# Patient Record
Sex: FEMALE | Race: WHITE | NOT HISPANIC OR LATINO | Employment: UNEMPLOYED | URBAN - NONMETROPOLITAN AREA
[De-identification: names, ages, dates, MRNs, and addresses within clinical notes are randomized per-mention and may not be internally consistent; named-entity substitution may affect disease eponyms.]

---

## 2022-02-28 ENCOUNTER — OFFICE VISIT (OUTPATIENT)
Dept: INTERNAL MEDICINE | Facility: CLINIC | Age: 27
End: 2022-02-28

## 2022-02-28 VITALS
DIASTOLIC BLOOD PRESSURE: 70 MMHG | SYSTOLIC BLOOD PRESSURE: 118 MMHG | OXYGEN SATURATION: 97 % | BODY MASS INDEX: 34.06 KG/M2 | HEART RATE: 88 BPM | HEIGHT: 67 IN | WEIGHT: 217 LBS | TEMPERATURE: 98.1 F

## 2022-02-28 DIAGNOSIS — R53.83 FATIGUE, UNSPECIFIED TYPE: ICD-10-CM

## 2022-02-28 DIAGNOSIS — Z13.0 SCREENING FOR BLOOD DISEASE: ICD-10-CM

## 2022-02-28 DIAGNOSIS — F32.A ANXIETY AND DEPRESSION: Primary | ICD-10-CM

## 2022-02-28 DIAGNOSIS — F41.9 ANXIETY AND DEPRESSION: Primary | ICD-10-CM

## 2022-02-28 DIAGNOSIS — Z13.220 LIPID SCREENING: ICD-10-CM

## 2022-02-28 DIAGNOSIS — Z23 NEED FOR DIPHTHERIA-TETANUS-PERTUSSIS (TDAP) VACCINE: ICD-10-CM

## 2022-02-28 DIAGNOSIS — E66.9 CLASS 1 OBESITY WITHOUT SERIOUS COMORBIDITY WITH BODY MASS INDEX (BMI) OF 34.0 TO 34.9 IN ADULT, UNSPECIFIED OBESITY TYPE: ICD-10-CM

## 2022-02-28 DIAGNOSIS — E55.9 VITAMIN D DEFICIENCY: ICD-10-CM

## 2022-02-28 PROCEDURE — 90715 TDAP VACCINE 7 YRS/> IM: CPT | Performed by: FAMILY MEDICINE

## 2022-02-28 PROCEDURE — 90471 IMMUNIZATION ADMIN: CPT | Performed by: FAMILY MEDICINE

## 2022-02-28 PROCEDURE — 99204 OFFICE O/P NEW MOD 45 MIN: CPT | Performed by: FAMILY MEDICINE

## 2022-02-28 RX ORDER — CITALOPRAM 20 MG/1
TABLET ORAL
COMMUNITY
Start: 2022-02-14 | End: 2022-05-19 | Stop reason: ALTCHOICE

## 2022-03-01 LAB
25(OH)D3+25(OH)D2 SERPL-MCNC: 35.7 NG/ML (ref 30–100)
ALBUMIN SERPL-MCNC: 4.5 G/DL (ref 3.5–5.2)
ALBUMIN/GLOB SERPL: 1.8 G/DL
ALP SERPL-CCNC: 58 U/L (ref 39–117)
ALT SERPL-CCNC: 18 U/L (ref 1–33)
AST SERPL-CCNC: 14 U/L (ref 1–32)
BASOPHILS # BLD AUTO: 0.03 10*3/MM3 (ref 0–0.2)
BASOPHILS NFR BLD AUTO: 0.5 % (ref 0–1.5)
BILIRUB SERPL-MCNC: 0.4 MG/DL (ref 0–1.2)
BUN SERPL-MCNC: 12 MG/DL (ref 6–20)
BUN/CREAT SERPL: 14.8 (ref 7–25)
CALCIUM SERPL-MCNC: 9.4 MG/DL (ref 8.6–10.5)
CHLORIDE SERPL-SCNC: 104 MMOL/L (ref 98–107)
CHOLEST SERPL-MCNC: 196 MG/DL (ref 0–200)
CO2 SERPL-SCNC: 24.9 MMOL/L (ref 22–29)
CREAT SERPL-MCNC: 0.81 MG/DL (ref 0.57–1)
EGFR GENE MUT ANL BLD/T: 101.5 ML/MIN/1.73
EOSINOPHIL # BLD AUTO: 0.07 10*3/MM3 (ref 0–0.4)
EOSINOPHIL NFR BLD AUTO: 1.3 % (ref 0.3–6.2)
ERYTHROCYTE [DISTWIDTH] IN BLOOD BY AUTOMATED COUNT: 12.5 % (ref 12.3–15.4)
FOLATE SERPL-MCNC: 9.79 NG/ML (ref 4.78–24.2)
GLOBULIN SER CALC-MCNC: 2.5 GM/DL
GLUCOSE SERPL-MCNC: 88 MG/DL (ref 65–99)
HCT VFR BLD AUTO: 44.2 % (ref 34–46.6)
HDLC SERPL-MCNC: 59 MG/DL (ref 40–60)
HGB BLD-MCNC: 15.2 G/DL (ref 12–15.9)
IMM GRANULOCYTES # BLD AUTO: 0.01 10*3/MM3 (ref 0–0.05)
IMM GRANULOCYTES NFR BLD AUTO: 0.2 % (ref 0–0.5)
LDLC SERPL CALC-MCNC: 126 MG/DL (ref 0–100)
LYMPHOCYTES # BLD AUTO: 2.36 10*3/MM3 (ref 0.7–3.1)
LYMPHOCYTES NFR BLD AUTO: 42.6 % (ref 19.6–45.3)
MCH RBC QN AUTO: 32.6 PG (ref 26.6–33)
MCHC RBC AUTO-ENTMCNC: 34.4 G/DL (ref 31.5–35.7)
MCV RBC AUTO: 94.8 FL (ref 79–97)
MONOCYTES # BLD AUTO: 0.32 10*3/MM3 (ref 0.1–0.9)
MONOCYTES NFR BLD AUTO: 5.8 % (ref 5–12)
NEUTROPHILS # BLD AUTO: 2.75 10*3/MM3 (ref 1.7–7)
NEUTROPHILS NFR BLD AUTO: 49.6 % (ref 42.7–76)
NRBC BLD AUTO-RTO: 0 /100 WBC (ref 0–0.2)
PLATELET # BLD AUTO: 258 10*3/MM3 (ref 140–450)
POTASSIUM SERPL-SCNC: 4.3 MMOL/L (ref 3.5–5.2)
PROT SERPL-MCNC: 7 G/DL (ref 6–8.5)
RBC # BLD AUTO: 4.66 10*6/MM3 (ref 3.77–5.28)
SODIUM SERPL-SCNC: 140 MMOL/L (ref 136–145)
T4 FREE SERPL-MCNC: 1.08 NG/DL (ref 0.93–1.7)
TRIGL SERPL-MCNC: 62 MG/DL (ref 0–150)
TSH SERPL DL<=0.005 MIU/L-ACNC: 1.34 UIU/ML (ref 0.27–4.2)
VIT B12 SERPL-MCNC: 736 PG/ML (ref 211–946)
VLDLC SERPL CALC-MCNC: 11 MG/DL (ref 5–40)
WBC # BLD AUTO: 5.54 10*3/MM3 (ref 3.4–10.8)

## 2022-03-06 PROBLEM — E55.9 VITAMIN D DEFICIENCY: Status: ACTIVE | Noted: 2022-03-06

## 2022-03-06 PROBLEM — F41.9 ANXIETY AND DEPRESSION: Status: ACTIVE | Noted: 2022-03-06

## 2022-03-06 PROBLEM — F32.A ANXIETY AND DEPRESSION: Status: ACTIVE | Noted: 2022-03-06

## 2022-03-10 ENCOUNTER — OFFICE VISIT (OUTPATIENT)
Dept: PSYCHIATRY | Facility: CLINIC | Age: 27
End: 2022-03-10

## 2022-03-10 DIAGNOSIS — F33.1 MAJOR DEPRESSIVE DISORDER, RECURRENT EPISODE, MODERATE: Primary | ICD-10-CM

## 2022-03-10 DIAGNOSIS — F41.1 GENERALIZED ANXIETY DISORDER: ICD-10-CM

## 2022-03-10 PROCEDURE — 90791 PSYCH DIAGNOSTIC EVALUATION: CPT | Performed by: SOCIAL WORKER
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
## 2022-04-28 ENCOUNTER — OFFICE VISIT (OUTPATIENT)
Dept: OBSTETRICS AND GYNECOLOGY | Facility: CLINIC | Age: 27
End: 2022-04-28

## 2022-04-28 VITALS — DIASTOLIC BLOOD PRESSURE: 64 MMHG | BODY MASS INDEX: 34.33 KG/M2 | SYSTOLIC BLOOD PRESSURE: 100 MMHG | WEIGHT: 219.2 LBS

## 2022-04-28 DIAGNOSIS — Z01.419 ENCOUNTER FOR GYNECOLOGICAL EXAMINATION WITHOUT ABNORMAL FINDING: Primary | ICD-10-CM

## 2022-04-28 DIAGNOSIS — Z30.432 ENCOUNTER FOR IUD REMOVAL: ICD-10-CM

## 2022-04-28 PROCEDURE — 99385 PREV VISIT NEW AGE 18-39: CPT | Performed by: OBSTETRICS & GYNECOLOGY

## 2022-04-28 PROCEDURE — 58301 REMOVE INTRAUTERINE DEVICE: CPT | Performed by: OBSTETRICS & GYNECOLOGY

## 2022-04-28 NOTE — PROGRESS NOTES
Subjective   Chief Complaint   Patient presents with   • Procedure     Patient here for IUD removal, and pap smear with cultures. Wanting to try for pregnancy.     Anastasiya Perales is a 26 y.o. year old  ( x 2).  No LMP recorded. Patient has had an implant.  She presents to be seen because of annual exam.     OTHER COMPLAINTS:  Nothing else    The following portions of the patient's history were reviewed and updated as appropriate:  She  has a past medical history of Anxiety, Endometriosis, Fatty liver, Ovarian cyst, and Postpartum depression.  She does not have any pertinent problems on file.  She  has a past surgical history that includes Laparoscopy and [place] tooth extraction.  Her family history includes Breast cancer in her maternal aunt; Heart disease in her paternal grandfather; Hypertension in her father and mother; Thyroid disease in her mother.  She  reports that she has never smoked. She has never used smokeless tobacco. She reports that she does not drink alcohol and does not use drugs.  Current Outpatient Medications   Medication Sig Dispense Refill   • citalopram (CeleXA) 20 MG tablet        No current facility-administered medications for this visit.     Current Outpatient Medications on File Prior to Visit   Medication Sig   • citalopram (CeleXA) 20 MG tablet      No current facility-administered medications on file prior to visit.     She is allergic to cyclobenzaprine and tramadol.    Social History    Tobacco Use      Smoking status: Never Smoker      Smokeless tobacco: Never Used    Review of Systems  Consitutional POS: nothing reported    NEG: anorexia or night sweats   Gastointestinal POS: nothing reported    NEG: bloating, change in bowel habits, melena or reflux symptoms   Genitourinary POS: nothing reported    NEG: dysuria or hematuria   Integument POS: nothing reported    NEG: moles that are changing in size, shape, color or rashes   Breast POS: nothing reported    NEG: persistent  breast lump, skin dimpling or nipple discharge         Respiratory: negative  Cardiovascular: negative          Objective   /64   Wt 99.4 kg (219 lb 3.2 oz)   Breastfeeding No   BMI 34.33 kg/m²     General:  well developed; well nourished  no acute distress   Skin:  No suspicious lesions seen   Thyroid: normal to inspection and palpation   Lungs:  breathing is unlabored  clear to auscultation bilaterally   Heart:  regular rate and rhythm, S1, S2 normal, no murmur, click, rub or gallop   Breasts:  Not performed.   Abdomen: soft, non-tender; no masses  no umbilical or inguinal hernias are present  no hepato-splenomegaly   Pelvis: Clinical staff was present for exam  External genitalia:  normal appearance of the external genitalia including Bartholin's and [place]'s glands.  :  urethral meatus normal;  Vaginal:  normal pink mucosa without prolapse or lesions.  Cervix:  normal appearance.  Uterus:  normal size, shape and consistency.  Adnexa:  normal bimanual exam of the adnexa.  Rectal:  digital rectal exam not performed; anus visually normal appearing.     Psychiatric: Alert and oriented ×3, mood and affect appropriate  HEENT: Atraumatic, normocephalic, normal scleral icterus  Extremities: 2+ pulses bilaterally, no edema      Lab Review   No data reviewed    Imaging   No data reviewed        Assessment   1. Normal PE     Plan   1. PAP done  2. IUD removed withoiut complcaition, intact    No orders of the defined types were placed in this encounter.         This note was electronically signed.      April 28, 2022

## 2022-05-19 ENCOUNTER — PATIENT MESSAGE (OUTPATIENT)
Dept: INTERNAL MEDICINE | Facility: CLINIC | Age: 27
End: 2022-05-19

## 2022-05-19 DIAGNOSIS — Z01.419 ENCOUNTER FOR GYNECOLOGICAL EXAMINATION WITHOUT ABNORMAL FINDING: ICD-10-CM

## 2022-05-19 NOTE — TELEPHONE ENCOUNTER
Noreen Otero LPN 5/19/2022 1:38 PM EDT    Please advise    ----- Message -----  From: Anastasiya Perales  Sent: 5/19/2022 1:28 PM EDT  To: De[facility]  Subject: Medication change     Hemodesta Johnson,  We are trying to conceive and I was thinking that maybe I should take you up on a safer option for my medication citalopram. If you don't mind can you please switch my medication to one that is safe with conceiving and being pregnant. I need it too be sent to [facility] in [facility] because I am due for a refill.   Thank you,   Anastasiya

## 2022-05-20 RX ORDER — CITALOPRAM 20 MG/1
20 TABLET ORAL DAILY
Qty: 90 TABLET | Refills: 1 | Status: SHIPPED | OUTPATIENT
Start: 2022-05-20

## 2022-06-08 DIAGNOSIS — N92.6 MISSED PERIOD: Primary | ICD-10-CM

## 2023-05-04 RX ORDER — CITALOPRAM 20 MG/1
20 TABLET ORAL DAILY
Qty: 90 TABLET | Refills: 1 | OUTPATIENT
Start: 2023-05-04

## 2023-05-04 NOTE — TELEPHONE ENCOUNTER
Rx Refill Note  Requested Prescriptions     Refused Prescriptions Disp Refills   • citalopram (CeleXA) 20 MG tablet [Pharmacy Med Name: CITALOPRAM 20MG TABLETS] 90 tablet 1     Sig: TAKE 1 TABLET BY MOUTH DAILY     Refused By: LILLIAM BEAVERS     Reason for Refusal: Patient needs an appointment      Last office visit with prescribing clinician: 2/28/2022   Last telemedicine visit with prescribing clinician: Visit date not found   Next office visit with prescribing clinician: Visit date not found                         Would you like a call back once the refill request has been completed: [] Yes [] No    If the office needs to give you a call back, can they leave a voicemail: [] Yes [] No    Lilliam Beavers MA  05/04/23, 17:07 EDT

## 2023-05-23 RX ORDER — CITALOPRAM 20 MG/1
20 TABLET ORAL DAILY
Qty: 90 TABLET | Refills: 1 | Status: SHIPPED | OUTPATIENT
Start: 2023-05-23

## 2023-10-23 RX ORDER — CITALOPRAM 20 MG/1
20 TABLET ORAL DAILY
Qty: 90 TABLET | Refills: 1 | Status: SHIPPED | OUTPATIENT
Start: 2023-10-23

## 2024-03-08 ENCOUNTER — PATIENT MESSAGE (OUTPATIENT)
Dept: INTERNAL MEDICINE | Facility: CLINIC | Age: 29
End: 2024-03-08

## 2024-03-11 NOTE — TELEPHONE ENCOUNTER
From: Anastasiya Perales  To: Jesusita Johnson  Sent: 3/8/2024 8:42 PM EST  Subject: Period issues    Hello Dr. Johnson,  I hope you are doing well. I am messaging you to hopefully get some information and your opinion. I started my second since having Vaelin on March 6th and it is overly heavy. I have bleed through everything. Last night I had a postpartum pad on and I bleed through it and on my bed senior care through the night. I am bleeding through tampons in under an hour. I am wearing a pad and pantiliner and bleeding through it in about two hours. My clots I am passing are anywhere from nerd size to peanut mnm size (sorry for the food reference). My first period was in the summer last year and was light and only three days. It's been almost a year since he was born but I am nursing. I was talking to my mom and she was telling me she had to get an ablation when she was in her 30s due to heavy periods and anemia. I was thinking about whether or not that could be an option for me.    - I don't plan on any more kids because we have 3.  - I can't do hormonal birth control because it makes me sick nor do I need it because my  has had a vasectomy.    What do you think? Also do you know if it's something that most insurances will cover?

## 2024-03-19 ENCOUNTER — TELEPHONE (OUTPATIENT)
Dept: INTERNAL MEDICINE | Facility: CLINIC | Age: 29
End: 2024-03-19

## 2024-03-19 NOTE — TELEPHONE ENCOUNTER
Caller: Anastasiya Perales    Relationship to patient: Self    Best call back number: [phone]     PATIENT IS WANTING TO GET AN ABLATION, BUT WAS TOLD THAT SHE NEEDED TO GO THROUGH GYNECOLOGY.  THEY TOLD HER THAT SHE NEEDED HER PRIMARY CARE TO GET THIS SCHEDULED.  ALSO, DOES DR NOLAN HAVE A SUGGESTION ON A THERAPIST?

## 2024-03-20 NOTE — TELEPHONE ENCOUNTER
I explained to the patient that she is established with Dr. Aashish Porter, and can get an office visit to have an evaluation for an Ablation.   [facility].

## 2024-04-09 ENCOUNTER — LAB (OUTPATIENT)
Dept: LAB | Facility: HOSPITAL | Age: 29
End: 2024-04-09
Payer: COMMERCIAL

## 2024-04-09 ENCOUNTER — OFFICE VISIT (OUTPATIENT)
Dept: OBSTETRICS AND GYNECOLOGY | Facility: CLINIC | Age: 29
End: 2024-04-09
Payer: COMMERCIAL

## 2024-04-09 VITALS
SYSTOLIC BLOOD PRESSURE: 102 MMHG | HEIGHT: 67 IN | DIASTOLIC BLOOD PRESSURE: 60 MMHG | WEIGHT: 250 LBS | BODY MASS INDEX: 39.24 KG/M2

## 2024-04-09 DIAGNOSIS — N93.9 ABNORMAL UTERINE BLEEDING (AUB): Primary | ICD-10-CM

## 2024-04-09 LAB
BASOPHILS # BLD AUTO: 0.04 10*3/MM3 (ref 0–0.2)
BASOPHILS NFR BLD AUTO: 0.6 % (ref 0–1.5)
DEPRECATED RDW RBC AUTO: 43.2 FL (ref 37–54)
EOSINOPHIL # BLD AUTO: 0.14 10*3/MM3 (ref 0–0.4)
EOSINOPHIL NFR BLD AUTO: 2.2 % (ref 0.3–6.2)
ERYTHROCYTE [DISTWIDTH] IN BLOOD BY AUTOMATED COUNT: 13.6 % (ref 12.3–15.4)
HBA1C MFR BLD: 5 % (ref 4.8–5.6)
HCT VFR BLD AUTO: 39.7 % (ref 34–46.6)
HGB BLD-MCNC: 13 G/DL (ref 12–15.9)
IMM GRANULOCYTES # BLD AUTO: 0.01 10*3/MM3 (ref 0–0.05)
IMM GRANULOCYTES NFR BLD AUTO: 0.2 % (ref 0–0.5)
LYMPHOCYTES # BLD AUTO: 2.04 10*3/MM3 (ref 0.7–3.1)
LYMPHOCYTES NFR BLD AUTO: 31.4 % (ref 19.6–45.3)
MCH RBC QN AUTO: 28.6 PG (ref 26.6–33)
MCHC RBC AUTO-ENTMCNC: 32.7 G/DL (ref 31.5–35.7)
MCV RBC AUTO: 87.4 FL (ref 79–97)
MONOCYTES # BLD AUTO: 0.49 10*3/MM3 (ref 0.1–0.9)
MONOCYTES NFR BLD AUTO: 7.6 % (ref 5–12)
NEUTROPHILS NFR BLD AUTO: 3.77 10*3/MM3 (ref 1.7–7)
NEUTROPHILS NFR BLD AUTO: 58 % (ref 42.7–76)
NRBC BLD AUTO-RTO: 0 /100 WBC (ref 0–0.2)
PLATELET # BLD AUTO: 279 10*3/MM3 (ref 140–450)
PMV BLD AUTO: 10.6 FL (ref 6–12)
RBC # BLD AUTO: 4.54 10*6/MM3 (ref 3.77–5.28)
WBC NRBC COR # BLD AUTO: 6.49 10*3/MM3 (ref 3.4–10.8)

## 2024-04-09 PROCEDURE — 99213 OFFICE O/P EST LOW 20 MIN: CPT | Performed by: OBSTETRICS & GYNECOLOGY

## 2024-04-09 PROCEDURE — 36415 COLL VENOUS BLD VENIPUNCTURE: CPT | Performed by: OBSTETRICS & GYNECOLOGY

## 2024-04-09 PROCEDURE — 83036 HEMOGLOBIN GLYCOSYLATED A1C: CPT | Performed by: OBSTETRICS & GYNECOLOGY

## 2024-04-09 PROCEDURE — 82627 DEHYDROEPIANDROSTERONE: CPT | Performed by: OBSTETRICS & GYNECOLOGY

## 2024-04-09 PROCEDURE — 80050 GENERAL HEALTH PANEL: CPT | Performed by: OBSTETRICS & GYNECOLOGY

## 2024-04-09 PROCEDURE — 84403 ASSAY OF TOTAL TESTOSTERONE: CPT | Performed by: OBSTETRICS & GYNECOLOGY

## 2024-04-09 NOTE — PROGRESS NOTES
Subjective   Chief Complaint   Patient presents with    Follow-up     Wants to discuss Ablation and hormone testing.     Anastasiya Perales is a 28 y.o. year old .  Patient's last menstrual period was 2024.  She presents to be seen because of one year s/p - breast feeding with hormonal acne and mood swings (around menses).     OTHER COMPLAINTS:  Nausea with OCP, pain with paraguard--    The following portions of the patient's history were reviewed and updated as appropriate:She  has a past medical history of Anxiety, Endometriosis, Fatty liver, Ovarian cyst, and Postpartum depression.  She does not have any pertinent problems on file.  She  has a past surgical history that includes Laparoscopy and [place] tooth extraction.  Her family history includes Breast cancer in her maternal aunt; Heart disease in her paternal grandfather; Hypertension in her father and mother; Thyroid disease in her mother.  She  reports that she has never smoked. She has never used smokeless tobacco. She reports that she does not drink alcohol and does not use drugs.  Current Outpatient Medications   Medication Sig Dispense Refill    citalopram (CeleXA) 20 MG tablet TAKE 1 TABLET BY MOUTH DAILY 90 tablet 1    Omega-3 Fatty Acids (fish oil) 1000 MG capsule capsule Take  by mouth Daily With Breakfast.      amoxicillin (AMOXIL) 500 MG capsule Take 2 capsules by mouth 2 (Two) Times a Day. (Patient not taking: Reported on 2024) 40 capsule 0    Prenatal Vit-Fe Fumarate-FA (PRENATAL COMPLETE PO) Take  by mouth. (Patient not taking: Reported on 2024)       No current facility-administered medications for this visit.     Current Outpatient Medications on File Prior to Visit   Medication Sig    citalopram (CeleXA) 20 MG tablet TAKE 1 TABLET BY MOUTH DAILY    Omega-3 Fatty Acids (fish oil) 1000 MG capsule capsule Take  by mouth Daily With Breakfast.    amoxicillin (AMOXIL) 500 MG capsule Take 2 capsules by mouth 2 (Two) Times a Day.  "(Patient not taking: Reported on 4/9/2024)    Prenatal Vit-Fe Fumarate-FA (PRENATAL COMPLETE PO) Take  by mouth. (Patient not taking: Reported on 4/9/2024)     No current facility-administered medications on file prior to visit.     She is allergic to cyclobenzaprine and tramadol.    Social History    Tobacco Use      Smoking status: Never      Smokeless tobacco: Never    Review of Systems  Consitutional POS: nothing reported    NEG: anorexia or night sweats   Gastointestinal POS: nothing reported    NEG: bloating, change in bowel habits, melena, or reflux symptoms   Genitourinary POS: nothing reported    NEG: dysuria or hematuria   Integument POS: nothing reported    NEG: moles that are changing in size, shape, color or rashes   Breast POS: nothing reported    NEG: persistent breast lump, skin dimpling, or nipple discharge         Respiratory: negative  Cardiovascular: negative  GYN:   see hpi          Objective   /60   Ht 170.2 cm (67\")   Wt 113 kg (250 lb)   LMP 03/06/2024   BMI 39.16 kg/m²     General:  well developed; well nourished  no acute distress   Skin:  No suspicious lesions seen   Thyroid: not examined   Lungs:  breathing is unlabored   Heart:  Not performed.   Breasts:  Not performed.   Abdomen: soft, non-tender; no masses  no umbilical or inguinal hernias are present  no hepato-splenomegaly   Pelvis: Not performed.     Psychiatric: Alert and oriented ×3, mood and affect appropriate  HEENT: Atraumatic, normocephalic, normal scleral icterus  Extremities: 2+ pulses bilaterally, no edema      Lab Review   CBC    Imaging   Pelvic ultrasound report        Assessment   Premenstrual mood lability  AUB: 2 menses since delvery one year ago--     Plan   LAbs today  TVS 2-4 weeks  Interested in Abaltion- partner has had ablation    No orders of the defined types were placed in this encounter.         This note was electronically signed.      April 9, 2024      "

## 2024-04-10 LAB
ALBUMIN SERPL-MCNC: 4.5 G/DL (ref 3.5–5.2)
ALBUMIN/GLOB SERPL: 1.6 G/DL
ALP SERPL-CCNC: 85 U/L (ref 39–117)
ALT SERPL W P-5'-P-CCNC: 16 U/L (ref 1–33)
ANION GAP SERPL CALCULATED.3IONS-SCNC: 11.8 MMOL/L (ref 5–15)
AST SERPL-CCNC: 19 U/L (ref 1–32)
BILIRUB SERPL-MCNC: 0.4 MG/DL (ref 0–1.2)
BUN SERPL-MCNC: 16 MG/DL (ref 6–20)
BUN/CREAT SERPL: 22.2 (ref 7–25)
CALCIUM SPEC-SCNC: 9.4 MG/DL (ref 8.6–10.5)
CHLORIDE SERPL-SCNC: 107 MMOL/L (ref 98–107)
CO2 SERPL-SCNC: 22.2 MMOL/L (ref 22–29)
CREAT SERPL-MCNC: 0.72 MG/DL (ref 0.57–1)
DHEA-S SERPL-MCNC: 196 UG/DL (ref 84.8–378)
EGFRCR SERPLBLD CKD-EPI 2021: 115.5 ML/MIN/1.73
GLOBULIN UR ELPH-MCNC: 2.8 GM/DL
GLUCOSE SERPL-MCNC: 86 MG/DL (ref 65–99)
POTASSIUM SERPL-SCNC: 4.2 MMOL/L (ref 3.5–5.2)
PROT SERPL-MCNC: 7.3 G/DL (ref 6–8.5)
SODIUM SERPL-SCNC: 141 MMOL/L (ref 136–145)
TESTOST SERPL-MCNC: 13 NG/DL (ref 8.4–48.1)
TSH SERPL DL<=0.05 MIU/L-ACNC: 1.13 UIU/ML (ref 0.27–4.2)

## 2024-05-09 RX ORDER — BUPROPION HYDROCHLORIDE 150 MG/1
150 TABLET ORAL DAILY
Qty: 90 TABLET | Refills: 3 | Status: SHIPPED | OUTPATIENT
Start: 2024-05-09 | End: 2024-05-10 | Stop reason: SDUPTHER

## 2024-05-10 ENCOUNTER — TELEPHONE (OUTPATIENT)
Dept: INTERNAL MEDICINE | Facility: CLINIC | Age: 29
End: 2024-05-10
Payer: COMMERCIAL

## 2024-05-10 RX ORDER — BUPROPION HYDROCHLORIDE 150 MG/1
150 TABLET ORAL DAILY
Qty: 90 TABLET | Refills: 3 | Status: SHIPPED | OUTPATIENT
Start: 2024-05-10

## 2024-05-15 ENCOUNTER — TELEPHONE (OUTPATIENT)
Dept: OBSTETRICS AND GYNECOLOGY | Facility: CLINIC | Age: 29
End: 2024-05-15

## 2024-05-15 NOTE — TELEPHONE ENCOUNTER
Caller: Anastasiya Perales    Relationship to patient: Self    Best call back number: [phone]    Patient is needing: PATIENT CALLED AND STATED THAT SHE SAW DR HESS IN [facility] AND WAS RECOMMENDED TO HAVE AN U/S AND AN ABLATION    PATIENT WOULD LIKE THE BILLING CODE FOR THE U/S AND THE ABLATION SO THAT SHE CAN VERIFY THE COVERAGE/COST W/ HER INSURANCE COMPANY

## 2024-06-27 ENCOUNTER — TELEPHONE (OUTPATIENT)
Dept: INTERNAL MEDICINE | Facility: CLINIC | Age: 29
End: 2024-06-27
Payer: COMMERCIAL

## 2024-06-27 NOTE — TELEPHONE ENCOUNTER
Scheduled an appt. for her when she has help with the children as she hasn't been seen since 2022. Started this med though May 10th. She also asked if her son's 15 mo. well child being a month late will be ok. You apparently did not have any well child appts. until Aug. 22nd.

## 2024-06-27 NOTE — TELEPHONE ENCOUNTER
"Left VM for mom to know Tannon\"s visit is okay to be on 08/22/2024 however it can be moved up if she would like.   "

## 2024-06-27 NOTE — TELEPHONE ENCOUNTER
Caller: Anastasiya Perales    Relationship: Self    Best call back number: [phone]     Which medication are you concerned about: BUPROPION XL (WELLBUTRIN XL) 150 MG    Who prescribed you this medication: GABRIEL NOLAN DO    When did you start taking this medication: 05.10.24    What are your concerns: PATIENT IS HAVING SIGNIFICANT MEMORY LOSS SINCE TAKING THE MEDICATION.    How long have you had these concerns: 3 WEEKS

## 2024-06-27 NOTE — TELEPHONE ENCOUNTER
Okay for well child to be a little late.  He could be scheduled in 2 office visits or a hospital follow up.

## 2024-06-27 NOTE — TELEPHONE ENCOUNTER
Per previous telephone encounter:  Looks like the medication issue was not addressed, however her child's appointment information was relayed and she was ok with keeping appt. On 08/22. Patient wants to know if she is to continue Wellbutrin or what she needs to do. PLEASE ADVISE.     Caller: Anastasiya Perales     Relationship: Self     Best call back number: [phone]      Which medication are you concerned about: BUPROPION XL (WELLBUTRIN XL) 150 MG     Who prescribed you this medication: GABRIEL NOLAN DO     When did you start taking this medication: 05.10.24     What are your concerns: PATIENT IS HAVING SIGNIFICANT MEMORY LOSS SINCE TAKING THE MEDICATION.     How long have you had these concerns: 3 WEEKS

## 2024-06-28 ENCOUNTER — TELEPHONE (OUTPATIENT)
Dept: INTERNAL MEDICINE | Facility: CLINIC | Age: 29
End: 2024-06-28
Payer: COMMERCIAL

## 2024-06-28 NOTE — TELEPHONE ENCOUNTER
Patient has not been seen since 2022 and was under the impression that when her appointment was scheduled for 7/29, that it was scheduled as a physical however was only scheduled as an office visit. She has some questions regarding this as well as 3 kids she would like to have wellchilds on before end of sept that needs to be same day due to only having one vehicle. Children mrn are [phone], [phone], [phone].

## 2024-07-01 NOTE — TELEPHONE ENCOUNTER
I spoke to Anastasiya, and got all the kids scheduled on 08/29/2024, and her appointment changed to a physical with a time change.

## 2024-07-09 ENCOUNTER — TELEPHONE (OUTPATIENT)
Dept: INTERNAL MEDICINE | Facility: CLINIC | Age: 29
End: 2024-07-09
Payer: COMMERCIAL

## 2024-07-09 NOTE — TELEPHONE ENCOUNTER
Caller: Anastasiya Perales    Relationship to patient: Self    Best call back number: [phone]    Chief complaint: DEPRESSION - MEDICATION     Type of visit: ANY KIND    Requested date: AS SOON AS POSSIBLE, SHE IDID NOT SOUND IN A VERY GOOD PLACE, WAS ADVISED THAT IF SHE FELT SHE NEEDED QUICKER ASSISTANCE TO CALL.    If rescheduling, when is the original appointment: 07/16/24     Additional notes:PATIENT IS ON WAIT LIST AS WELL. OFFERED HER ANOTHER PROVIDER BUT SHE WANTS TO SEE DR. NOLAN BECAUSE SHE IS FAMILIAR WITH HER HISTORY.

## 2024-07-16 ENCOUNTER — OFFICE VISIT (OUTPATIENT)
Dept: INTERNAL MEDICINE | Facility: CLINIC | Age: 29
End: 2024-07-16
Payer: COMMERCIAL

## 2024-07-16 VITALS
TEMPERATURE: 98 F | HEART RATE: 78 BPM | WEIGHT: 255 LBS | HEIGHT: 67 IN | SYSTOLIC BLOOD PRESSURE: 122 MMHG | OXYGEN SATURATION: 98 % | DIASTOLIC BLOOD PRESSURE: 82 MMHG | BODY MASS INDEX: 40.02 KG/M2

## 2024-07-16 DIAGNOSIS — F32.A ANXIETY AND DEPRESSION: Primary | ICD-10-CM

## 2024-07-16 DIAGNOSIS — F41.9 ANXIETY AND DEPRESSION: Primary | ICD-10-CM

## 2024-07-16 PROCEDURE — 99213 OFFICE O/P EST LOW 20 MIN: CPT | Performed by: FAMILY MEDICINE

## 2024-07-16 RX ORDER — BUSPIRONE HYDROCHLORIDE 5 MG/1
5 TABLET ORAL 3 TIMES DAILY PRN
Qty: 180 TABLET | Refills: 1 | Status: SHIPPED | OUTPATIENT
Start: 2024-07-16

## 2024-07-16 RX ORDER — CITALOPRAM HYDROBROMIDE 10 MG/1
10 TABLET ORAL DAILY
Qty: 90 TABLET | Refills: 3 | Status: SHIPPED | OUTPATIENT
Start: 2024-07-16

## 2024-07-16 NOTE — PROGRESS NOTES
Anastasiya Perales is a 28 y.o. female.    Chief Complaint   Patient presents with    Anxiety    Depression       HPI   History of Present Illness  The patient presents today to follow up on anxiety and depression.    She is currently not on any medications, but has found an herbalist who supplements her with tea and colloidal herbs. She discontinued Celexa 2.5 months ago before starting Wellbutrin . While on Celexa, she felt well but experienced a low libido. She also experienced visual disturbances while on the Celexa.  After starting Wellbutrin, she noticed that her memory was poor, often forgetting certain things.  She has since discontinued Wellbutrin.  She experienced a panic attack this morning while exercising in the pool. Her grandfather passed away, which she believes triggered her anxiety by seeing people from the past and the . She has been working with a therapist.  She reports feeling sad or down almost daily, similar to postpartum depression. She denies any self-harm ideation. She experiences mood swings and racing thoughts. She has not taken BuSpar.    The following portions of the patient's history were reviewed and updated as appropriate: allergies, current medications, past family history, past medical history, past social history, past surgical history and problem list.     Allergies   Allergen Reactions    Cyclobenzaprine Anaphylaxis    Tramadol Myalgia         Current Outpatient Medications:     citalopram (CeleXA) 10 MG tablet, Take 1 tablet by mouth Daily., Disp: 90 tablet, Rfl: 3    busPIRone (BUSPAR) 5 MG tablet, Take 1 tablet by mouth 3 (Three) Times a Day As Needed (anxiety)., Disp: 180 tablet, Rfl: 1    ROS    Review of Systems   Constitutional:  Positive for fatigue. Negative for chills and fever.   Respiratory:  Negative for shortness of breath.    Cardiovascular:  Negative for chest pain.   Psychiatric/Behavioral:  Positive for agitation, dysphoric mood and stress. The patient is  "nervous/anxious.        Vitals:    07/16/24 1511   BP: 122/82   BP Location: Right arm   Patient Position: Sitting   Cuff Size: Adult   Pulse: 78   Temp: 98 °F (36.7 °C)   SpO2: 98%   Weight: 116 kg (255 lb)   Height: 170.2 cm (67\")         Physical Exam     Physical Exam  Constitutional:       General: She is not in acute distress.     Appearance: Normal appearance. She is well-developed.   HENT:      Head: Normocephalic and atraumatic.      Right Ear: External ear normal.      Left Ear: External ear normal.   Eyes:      Extraocular Movements: Extraocular movements intact.      Conjunctiva/sclera: Conjunctivae normal.   Cardiovascular:      Rate and Rhythm: Normal rate and regular rhythm.      Heart sounds: No murmur heard.  Pulmonary:      Effort: Pulmonary effort is normal. No respiratory distress.      Breath sounds: Normal breath sounds. No wheezing.   Abdominal:      General: Bowel sounds are normal. There is no distension.      Palpations: Abdomen is soft.      Tenderness: There is no abdominal tenderness.   Musculoskeletal:      Right lower leg: No edema.      Left lower leg: No edema.   Skin:     General: Skin is warm and dry.   Neurological:      Mental Status: She is alert and oriented to person, place, and time.      Cranial Nerves: No cranial nerve deficit.   Psychiatric:         Mood and Affect: Affect is tearful.         Behavior: Behavior normal.             Diagnoses and all orders for this visit:    1. Anxiety and depression (Primary)    Other orders  -     busPIRone (BUSPAR) 5 MG tablet; Take 1 tablet by mouth 3 (Three) Times a Day As Needed (anxiety).  Dispense: 180 tablet; Refill: 1  -     citalopram (CeleXA) 10 MG tablet; Take 1 tablet by mouth Daily.  Dispense: 90 tablet; Refill: 3        Assessment & Plan  1. Anxiety and depression.  Uncontrolled without medication. I will start back on a low dose of Celexa. I encouraged her to take BuSpar 1 to 3 times daily as needed for anxiety.  Discussed " potential side effects of the medications.    Follow-up  The patient will follow up in 6 weeks for annual physical and medication management.    New Medications Ordered This Visit   Medications    busPIRone (BUSPAR) 5 MG tablet     Sig: Take 1 tablet by mouth 3 (Three) Times a Day As Needed (anxiety).     Dispense:  180 tablet     Refill:  1    citalopram (CeleXA) 10 MG tablet     Sig: Take 1 tablet by mouth Daily.     Dispense:  90 tablet     Refill:  3     Patient needs appointment for further refills.       No orders of the defined types were placed in this encounter.      Return in about 6 weeks (around 8/27/2024) for Annual.    Jesusita Johnson, DO

## 2024-10-29 ENCOUNTER — OFFICE VISIT (OUTPATIENT)
Dept: INTERNAL MEDICINE | Facility: CLINIC | Age: 29
End: 2024-10-29
Payer: COMMERCIAL

## 2024-10-29 VITALS
DIASTOLIC BLOOD PRESSURE: 64 MMHG | WEIGHT: 250 LBS | OXYGEN SATURATION: 100 % | TEMPERATURE: 97.9 F | SYSTOLIC BLOOD PRESSURE: 118 MMHG | BODY MASS INDEX: 40.18 KG/M2 | HEART RATE: 74 BPM | RESPIRATION RATE: 18 BRPM | HEIGHT: 66 IN

## 2024-10-29 DIAGNOSIS — F41.9 ANXIETY AND DEPRESSION: ICD-10-CM

## 2024-10-29 DIAGNOSIS — E66.01 MORBID (SEVERE) OBESITY DUE TO EXCESS CALORIES: ICD-10-CM

## 2024-10-29 DIAGNOSIS — F32.A ANXIETY AND DEPRESSION: ICD-10-CM

## 2024-10-29 DIAGNOSIS — Z00.00 WELL ADULT EXAM: Primary | ICD-10-CM

## 2024-10-29 PROCEDURE — 99395 PREV VISIT EST AGE 18-39: CPT | Performed by: FAMILY MEDICINE

## 2024-10-29 NOTE — PROGRESS NOTES
Anastasiya Perales is a 29 y.o. female.    Chief Complaint   Patient presents with    Annual Exam    Obesity     Discuss weight loss surgery options       HPI   History of Present Illness  The patient presents today for an annual physical exam and to discuss obesity.    She has been diligently tracking her diet, weighing her food, counting calories, and recording everything she eats for the past six months. Despite these efforts, she has not seen significant weight loss. Her weight fluctuates between 247 and 250 pounds, particularly around her menstrual cycle. She is not overeating and is physically active throughout the day. She has replaced some  meals with protein shakes and is consuming smaller portions. She is considering gastric sleeve surgery. She suspects she may have sleep apnea due to waking up gasping for air at night. She has not taken any weight loss medications other than Wellbutrin. She is concerned about the potential cancer risk associated with Ozempic.    She experiences frequent nervousness and worry, particularly about financial matters. She his taking Celexa dosage and is undergoing therapy. She does not report feelings of sadness, hopelessness, or worthlessness. She tends to withdraw a day or two before her menstrual cycle, but has found that taking buspar on these days helps.      She is not up-to-date with her dental exams and experiences tooth sensitivity. She had an eye exam last year. She completed a pap 2 years ago.  She does not report any nausea, vomiting, or diarrhea, but notes that her bowel movements change with protein shakes. She does not report any respiratory symptoms. She feels mentally fatigued and is not getting adequate rest at night. She often feels hot and sweats frequently. She does not report any urinary issues, body aches, pains, rashes, bruises, or frequent headaches.      IMMUNIZATIONS  She did not get the influenza vaccine. She did not get the COVID-19 vaccines.  She is  up to date on tdap.         The following portions of the patient's history were reviewed and updated as appropriate: allergies, current medications, past family history, past medical history, past social history, past surgical history and problem list.     Past Medical History:   Diagnosis Date    Anxiety     Endometriosis     Fatty liver     Ovarian cyst     Postpartum depression        Past Surgical History:   Procedure Laterality Date    DIAGNOSTIC LAPAROSCOPY      checked for endometriosis    WISDOM TOOTH EXTRACTION         Family History   Problem Relation Age of Onset    Hypertension Mother     Thyroid disease Mother     Hypertension Father     Heart disease Paternal Grandfather     Breast cancer Maternal Aunt         great aunt       Social History     Socioeconomic History    Marital status:    Tobacco Use    Smoking status: Never     Passive exposure: Never    Smokeless tobacco: Never   Vaping Use    Vaping status: Never Used   Substance and Sexual Activity    Alcohol use: Never    Drug use: Never    Sexual activity: Yes     Partners: Male     Birth control/protection: I.U.D.       Allergies   Allergen Reactions    Cyclobenzaprine Anaphylaxis    Wellbutrin [Bupropion] Mental Status Change     Forgetting time    Tramadol Myalgia         Current Outpatient Medications:     busPIRone (BUSPAR) 5 MG tablet, Take 1 tablet by mouth 3 (Three) Times a Day As Needed (anxiety)., Disp: 180 tablet, Rfl: 1    citalopram (CeleXA) 10 MG tablet, Take 1 tablet by mouth Daily., Disp: 90 tablet, Rfl: 3    metFORMIN (GLUCOPHAGE) 500 MG tablet, Take 1 tablet by mouth 2 (Two) Times a Day With Meals., Disp: 180 tablet, Rfl: 1    ROS    Review of Systems   Constitutional:  Positive for fatigue. Negative for chills and fever.   HENT:  Negative for congestion, postnasal drip and sore throat.    Eyes:  Negative for visual disturbance.   Respiratory:  Positive for shortness of breath (with anxiety). Negative for cough.   "  Cardiovascular:  Positive for chest pain (with anxiety). Negative for leg swelling.   Gastrointestinal:  Negative for abdominal pain, constipation, diarrhea, nausea and vomiting.   Endocrine: Positive for heat intolerance. Negative for cold intolerance.   Genitourinary:  Negative for difficulty urinating.   Musculoskeletal:  Negative for arthralgias.   Skin:  Negative for rash.   Allergic/Immunologic: Negative for environmental allergies.   Neurological:  Negative for headache.   Hematological:  Does not bruise/bleed easily.   Psychiatric/Behavioral:  Positive for depressed mood. The patient is nervous/anxious.        Vitals:    10/29/24 1350   BP: 118/64   Pulse: 74   Resp: 18   Temp: 97.9 °F (36.6 °C)   TempSrc: Temporal   SpO2: 100%   Weight: 113 kg (250 lb)   Height: 167.6 cm (66\")   PainSc: 0-No pain     Body mass index is 40.35 kg/m².    Physical Exam     Physical Exam  Constitutional:       General: She is not in acute distress.     Appearance: Normal appearance. She is well-developed.   HENT:      Head: Normocephalic and atraumatic.      Right Ear: Tympanic membrane and external ear normal.      Left Ear: Tympanic membrane and external ear normal.      Mouth/Throat:      Pharynx: No posterior oropharyngeal erythema.   Eyes:      Extraocular Movements: Extraocular movements intact.      Conjunctiva/sclera: Conjunctivae normal.      Pupils: Pupils are equal, round, and reactive to light.   Cardiovascular:      Rate and Rhythm: Normal rate and regular rhythm.      Heart sounds: No murmur heard.  Pulmonary:      Effort: Pulmonary effort is normal. No respiratory distress.      Breath sounds: Normal breath sounds. No wheezing.   Abdominal:      General: Bowel sounds are normal. There is no distension.      Palpations: Abdomen is soft.      Tenderness: There is no abdominal tenderness.   Musculoskeletal:      Cervical back: Neck supple.      Right lower leg: No edema.      Left lower leg: No edema. "   Lymphadenopathy:      Cervical: No cervical adenopathy.   Skin:     General: Skin is warm and dry.   Neurological:      Mental Status: She is alert and oriented to person, place, and time.      Cranial Nerves: No cranial nerve deficit.      Deep Tendon Reflexes: Reflexes normal.   Psychiatric:         Mood and Affect: Mood normal.         Behavior: Behavior normal.         Assessment/Plan    Diagnoses and all orders for this visit:    1. Well adult exam (Primary)    2. Anxiety and depression    3. Morbid (severe) obesity due to excess calories    Other orders  -     metFORMIN (GLUCOPHAGE) 500 MG tablet; Take 1 tablet by mouth 2 (Two) Times a Day With Meals.  Dispense: 180 tablet; Refill: 1        Assessment & Plan  1. Annual physical exam.  . Routine health maintenance including vaccines, dental/eye health, healthy diet, exercise, and Pap smears were discussed. Mental health has been addressed today as well. Routine labs were reviewed from earlier this year.    2. Obesity.  Her BMI is 40.4, just barely qualifying her for bariatric surgery. Metformin will be started to aid in weight loss. She will continue to monitor her diet and try to incorporate more exercise. She has been given Dr. Cochran's phone number to contact and schedule her own appointment for further weight management and potential bariatric surgery.    3. Anxiety and depression.  Symptoms have improved with current medication. She will continue BuSpar and Celexa. Counseling will also be continued.    Follow-up  Return in 6 months.    New Medications Ordered This Visit   Medications    metFORMIN (GLUCOPHAGE) 500 MG tablet     Sig: Take 1 tablet by mouth 2 (Two) Times a Day With Meals.     Dispense:  180 tablet     Refill:  1       No orders of the defined types were placed in this encounter.      Return in about 6 months (around 4/29/2025) for anxiety and depression.      Jesusita Johnson DO

## 2024-11-13 RX ORDER — BUSPIRONE HYDROCHLORIDE 5 MG/1
5 TABLET ORAL 3 TIMES DAILY PRN
Qty: 180 TABLET | Refills: 0 | Status: SHIPPED | OUTPATIENT
Start: 2024-11-13

## 2024-12-26 ENCOUNTER — TELEPHONE (OUTPATIENT)
Dept: INTERNAL MEDICINE | Facility: CLINIC | Age: 29
End: 2024-12-26
Payer: COMMERCIAL

## 2024-12-28 ENCOUNTER — PATIENT MESSAGE (OUTPATIENT)
Dept: INTERNAL MEDICINE | Facility: CLINIC | Age: 29
End: 2024-12-28
Payer: COMMERCIAL

## 2025-01-02 ENCOUNTER — PRIOR AUTHORIZATION (OUTPATIENT)
Dept: INTERNAL MEDICINE | Facility: CLINIC | Age: 30
End: 2025-01-02
Payer: COMMERCIAL

## 2025-01-02 NOTE — TELEPHONE ENCOUNTER
Dear JULIA ACOSTA:  We’re pleased to let you know that we’ve approved your or your doctor’s request for coverage  for Zepbound Injection (tirzepatide). You can now fill your prescription, and it will be covered  according to your plan.  As long as you remain covered by your prescription drug plan and there are no changes to your  plan benefits, this request is approved from 01/02/2025 to 09/02/2025. When this approval  expires, please speak to your doctor about your treatment.

## 2025-01-23 ENCOUNTER — TELEPHONE (OUTPATIENT)
Dept: INTERNAL MEDICINE | Facility: CLINIC | Age: 30
End: 2025-01-23

## 2025-01-23 NOTE — TELEPHONE ENCOUNTER
Caller: Anastasiya Perales    Relationship: Self    Best call back number: [phone]    What is the best time to reach you: ANY    Who are you requesting to speak with (clinical staff, provider,  specific staff member): CLINICAL    Do you know the name of the person who called: DR NOLAN    What was the call regarding: DR NOLAN ASKED THAT PATIENT CALL BACK TO UPDATE HOW SHE IS DOING ON THE CURRENT DOSE OF ZEPBOUND.   PATIENT RESPONDS THAT SHE IS DOING WELL . NO SIDE EFFECTS . ALMOST 9 POUNDS DOWN.     PATIENT HAS ONE DOSE LEFT WILL TAKE THIS WEEKEND ON SATURDAY, 01/25/25, WILL BE READY FOR NEXT LEVEL DOSE AFTER THIS WEEKEND.    PHARMACY: [id number] - [address], [place] - [address] - [phone] [facility] [phone] FX     Is it okay if the provider responds through [facility]: YES

## 2025-05-05 ENCOUNTER — OFFICE VISIT (OUTPATIENT)
Dept: INTERNAL MEDICINE | Facility: CLINIC | Age: 30
End: 2025-05-05
Payer: COMMERCIAL

## 2025-05-05 VITALS
WEIGHT: 225 LBS | TEMPERATURE: 97.6 F | OXYGEN SATURATION: 96 % | SYSTOLIC BLOOD PRESSURE: 112 MMHG | HEIGHT: 66 IN | HEART RATE: 112 BPM | BODY MASS INDEX: 36.16 KG/M2 | DIASTOLIC BLOOD PRESSURE: 64 MMHG

## 2025-05-05 DIAGNOSIS — E66.812 CLASS 2 OBESITY DUE TO EXCESS CALORIES WITHOUT SERIOUS COMORBIDITY WITH BODY MASS INDEX (BMI) OF 36.0 TO 36.9 IN ADULT: Primary | ICD-10-CM

## 2025-05-05 DIAGNOSIS — F32.A ANXIETY AND DEPRESSION: ICD-10-CM

## 2025-05-05 DIAGNOSIS — E66.09 CLASS 2 OBESITY DUE TO EXCESS CALORIES WITHOUT SERIOUS COMORBIDITY WITH BODY MASS INDEX (BMI) OF 36.0 TO 36.9 IN ADULT: Primary | ICD-10-CM

## 2025-05-05 DIAGNOSIS — F41.9 ANXIETY AND DEPRESSION: ICD-10-CM

## 2025-05-05 PROBLEM — E66.01 MORBID (SEVERE) OBESITY DUE TO EXCESS CALORIES: Status: RESOLVED | Noted: 2024-10-29 | Resolved: 2025-05-05

## 2025-05-05 NOTE — PROGRESS NOTES
Anastasiya ePrales is a 29 y.o. female.    Chief Complaint   Patient presents with    Anxiety    Depression    Obesity              HPI   History of Present Illness  The patient presents for follow-up on anxiety, depression, and obesity.    She reports a weight loss of 25 pounds attributed to Zepbound 7.5 mg.   Despite regular exercise and physical labor, she perceives slow progress. Her dietitian recommended 130 g of daily protein, which she finds challenging. She reports gastrointestinal disturbances and localized reactions at injection sites, especially in the thigh. Abdominal injections result in erythema and bruising, resembling a large mosquito bite, persisting for 1.5 weeks. Pain was noted during the last attempt below the umbilicus. No pruritus at injection sites. Insurance coverage extends until 08/2025, aiming to reduce weight to 200 pounds by then, with a long-term goal of 180 pounds. She lost approximately 6 pounds in the past month, noting illness or diarrhea can temporarily increase weight loss. No chest pain or shortness of breath. Occasional nausea and diarrhea, but not since starting 7.5 mg dose. No cough, congestion, or rhinorrhea. No abdominal tenderness upon palpation.    She believes citalopram is effective and wants to gradually discontinue it. No use of BuSpar. Correlation noted between menstrual cycle and symptoms, with benefits from consistent vitamin intake and dietary modifications, including pumpkin seeds. Anticipates next menstrual cycle next week. Recently added B complex supplement, improving energy levels, eliminating the need for afternoon coffee.    The following portions of the patient's history were reviewed and updated as appropriate: allergies, current medications, past family history, past medical history, past social history, past surgical history and problem list.     Allergies   Allergen Reactions    Cyclobenzaprine Anaphylaxis    Wellbutrin [Bupropion] Mental Status Change      "Forgetting time    Tramadol Myalgia         Current Outpatient Medications:     citalopram (CeleXA) 10 MG tablet, Take 1 tablet by mouth Daily., Disp: 90 tablet, Rfl: 3    Tirzepatide-Weight Management (ZEPBOUND) 10 MG/0.5ML solution auto-injector, Inject 0.5 mL under the skin into the appropriate area as directed 1 (One) Time Per Week., Disp: 2 mL, Rfl: 5    ROS    Review of Systems   Constitutional:  Negative for chills, fatigue and fever.   HENT:  Negative for congestion.    Respiratory:  Negative for cough and shortness of breath.    Cardiovascular:  Negative for chest pain.   Gastrointestinal:  Positive for diarrhea and nausea. Negative for abdominal pain, vomiting and GERD.   Psychiatric/Behavioral:  Negative for depressed mood. The patient is not nervous/anxious.        Vitals:    05/05/25 0938   BP: 112/64   Pulse: 112   Temp: 97.6 °F (36.4 °C)   SpO2: 96%   Weight: 102 kg (225 lb)   Height: 167.6 cm (65.98\")         Physical Exam     Physical Exam  Constitutional:       General: She is not in acute distress.     Appearance: Normal appearance. She is well-developed.   HENT:      Head: Normocephalic and atraumatic.      Right Ear: External ear normal.      Left Ear: External ear normal.   Eyes:      Extraocular Movements: Extraocular movements intact.      Conjunctiva/sclera: Conjunctivae normal.   Cardiovascular:      Rate and Rhythm: Normal rate and regular rhythm.      Heart sounds: No murmur heard.  Pulmonary:      Effort: Pulmonary effort is normal. No respiratory distress.      Breath sounds: Normal breath sounds. No wheezing.   Abdominal:      General: Bowel sounds are normal. There is no distension.      Palpations: Abdomen is soft.      Tenderness: There is no abdominal tenderness.   Musculoskeletal:      Right lower leg: No edema.      Left lower leg: No edema.   Skin:     General: Skin is warm and dry.   Neurological:      Mental Status: She is alert and oriented to person, place, and time.      " Cranial Nerves: No cranial nerve deficit.   Psychiatric:         Mood and Affect: Mood normal.         Behavior: Behavior normal.         Assessment/Plan    Diagnoses and all orders for this visit:    1. Class 2 obesity due to excess calories without serious comorbidity with body mass index (BMI) of 36.0 to 36.9 in adult (Primary)    2. Anxiety and depression    Other orders  -     Tirzepatide-Weight Management (ZEPBOUND) 10 MG/0.5ML solution auto-injector; Inject 0.5 mL under the skin into the appropriate area as directed 1 (One) Time Per Week.  Dispense: 2 mL; Refill: 5        Assessment & Plan  1. Obesity.  - Improvement noted with current medication regimen.  - Mild gastrointestinal issues and injection site reactions reported.  - Discussed weight loss progress and dietary adjustments, including increased protein intake.  - Prescription for Zepbound 10 mg will be sent to the pharmacy. Continue healthy diet and regular exercise.    2. Anxiety and depression.  - Condition stable with current medication regimen.  - Advised to reduce Celexa dosage to half a tablet daily for the next month and may discontinue if stable.      New Medications Ordered This Visit   Medications    Tirzepatide-Weight Management (ZEPBOUND) 10 MG/0.5ML solution auto-injector     Sig: Inject 0.5 mL under the skin into the appropriate area as directed 1 (One) Time Per Week.     Dispense:  2 mL     Refill:  5       No orders of the defined types were placed in this encounter.      Return in about 6 months (around 11/5/2025) for Annual.    Jesusita Johnson DO

## 2025-07-03 ENCOUNTER — PRIOR AUTHORIZATION (OUTPATIENT)
Dept: INTERNAL MEDICINE | Facility: CLINIC | Age: 30
End: 2025-07-03
Payer: COMMERCIAL

## 2025-07-09 RX ORDER — SEMAGLUTIDE 1.7 MG/.75ML
1.7 INJECTION, SOLUTION SUBCUTANEOUS WEEKLY
Qty: 3 ML | Refills: 2 | Status: SHIPPED | OUTPATIENT
Start: 2025-07-09

## 2025-08-18 ENCOUNTER — TELEPHONE (OUTPATIENT)
Dept: INTERNAL MEDICINE | Facility: CLINIC | Age: 30
End: 2025-08-18
Payer: COMMERCIAL

## 2025-08-19 ENCOUNTER — PRIOR AUTHORIZATION (OUTPATIENT)
Dept: INTERNAL MEDICINE | Facility: CLINIC | Age: 30
End: 2025-08-19
Payer: COMMERCIAL

## 2025-08-22 ENCOUNTER — TELEPHONE (OUTPATIENT)
Dept: INTERNAL MEDICINE | Facility: CLINIC | Age: 30
End: 2025-08-22
Payer: COMMERCIAL